# Patient Record
Sex: MALE | Race: BLACK OR AFRICAN AMERICAN | NOT HISPANIC OR LATINO
[De-identification: names, ages, dates, MRNs, and addresses within clinical notes are randomized per-mention and may not be internally consistent; named-entity substitution may affect disease eponyms.]

---

## 2021-06-28 ENCOUNTER — NON-APPOINTMENT (OUTPATIENT)
Age: 36
End: 2021-06-28

## 2021-08-02 ENCOUNTER — APPOINTMENT (OUTPATIENT)
Dept: INTERNAL MEDICINE | Facility: CLINIC | Age: 36
End: 2021-08-02
Payer: MEDICAID

## 2021-08-02 VITALS
DIASTOLIC BLOOD PRESSURE: 58 MMHG | HEIGHT: 69 IN | SYSTOLIC BLOOD PRESSURE: 102 MMHG | BODY MASS INDEX: 19.7 KG/M2 | TEMPERATURE: 98.7 F | WEIGHT: 133 LBS | OXYGEN SATURATION: 98 % | HEART RATE: 70 BPM

## 2021-08-02 DIAGNOSIS — R31.29 OTHER MICROSCOPIC HEMATURIA: ICD-10-CM

## 2021-08-02 DIAGNOSIS — Z23 ENCOUNTER FOR IMMUNIZATION: ICD-10-CM

## 2021-08-02 DIAGNOSIS — F17.210 NICOTINE DEPENDENCE, CIGARETTES, UNCOMPLICATED: ICD-10-CM

## 2021-08-02 DIAGNOSIS — Z72.89 OTHER PROBLEMS RELATED TO LIFESTYLE: ICD-10-CM

## 2021-08-02 DIAGNOSIS — Z00.00 ENCOUNTER FOR GENERAL ADULT MEDICAL EXAMINATION W/OUT ABNORMAL FINDINGS: ICD-10-CM

## 2021-08-02 LAB
BILIRUB UR QL STRIP: NEGATIVE
CLARITY UR: CLEAR
COLLECTION METHOD: NORMAL
GLUCOSE UR-MCNC: NEGATIVE
HCG UR QL: 1 EU/DL
HGB UR QL STRIP.AUTO: NORMAL
KETONES UR-MCNC: NEGATIVE
LEUKOCYTE ESTERASE UR QL STRIP: NEGATIVE
NITRITE UR QL STRIP: NEGATIVE
PH UR STRIP: 6
PROT UR STRIP-MCNC: NEGATIVE
SP GR UR STRIP: >=1.03

## 2021-08-02 PROCEDURE — 36415 COLL VENOUS BLD VENIPUNCTURE: CPT

## 2021-08-02 PROCEDURE — 99385 PREV VISIT NEW AGE 18-39: CPT | Mod: 25

## 2021-08-02 PROCEDURE — 81003 URINALYSIS AUTO W/O SCOPE: CPT | Mod: QW

## 2021-08-02 NOTE — PHYSICAL EXAM
[Normal] : normal gait, coordination grossly intact, no focal deficits [Normal Male:] : meatus normal, the bladder was normal on palpation, the scrotum was normal, there were no testicular masses and no prostate nodules. [de-identified] : testicles no masses

## 2021-08-03 LAB
ALBUMIN SERPL ELPH-MCNC: 4.3 G/DL
ALP BLD-CCNC: 55 U/L
ALT SERPL-CCNC: 8 U/L
ANION GAP SERPL CALC-SCNC: 13 MMOL/L
APPEARANCE: CLEAR
AST SERPL-CCNC: 15 U/L
BACTERIA: NEGATIVE
BASOPHILS # BLD AUTO: 0.03 K/UL
BASOPHILS NFR BLD AUTO: 0.5 %
BILIRUB SERPL-MCNC: 0.6 MG/DL
BILIRUBIN URINE: NEGATIVE
BLOOD URINE: NEGATIVE
BUN SERPL-MCNC: 12 MG/DL
CALCIUM SERPL-MCNC: 9.2 MG/DL
CHLORIDE SERPL-SCNC: 101 MMOL/L
CHOLEST SERPL-MCNC: 145 MG/DL
CO2 SERPL-SCNC: 26 MMOL/L
COLOR: ABNORMAL
CREAT SERPL-MCNC: 0.93 MG/DL
EOSINOPHIL # BLD AUTO: 0.06 K/UL
EOSINOPHIL NFR BLD AUTO: 1 %
GLUCOSE QUALITATIVE U: NEGATIVE
GLUCOSE SERPL-MCNC: 74 MG/DL
HCT VFR BLD CALC: 45.9 %
HDLC SERPL-MCNC: 47 MG/DL
HGB BLD-MCNC: 14.8 G/DL
HYALINE CASTS: 3 /LPF
IMM GRANULOCYTES NFR BLD AUTO: 0.2 %
KETONES URINE: NEGATIVE
LDLC SERPL CALC-MCNC: 78 MG/DL
LEUKOCYTE ESTERASE URINE: NEGATIVE
LYMPHOCYTES # BLD AUTO: 2.17 K/UL
LYMPHOCYTES NFR BLD AUTO: 37.2 %
MAN DIFF?: NORMAL
MCHC RBC-ENTMCNC: 31.1 PG
MCHC RBC-ENTMCNC: 32.2 GM/DL
MCV RBC AUTO: 96.4 FL
MICROSCOPIC-UA: NORMAL
MONOCYTES # BLD AUTO: 0.57 K/UL
MONOCYTES NFR BLD AUTO: 9.8 %
NEUTROPHILS # BLD AUTO: 2.99 K/UL
NEUTROPHILS NFR BLD AUTO: 51.3 %
NITRITE URINE: NEGATIVE
NONHDLC SERPL-MCNC: 98 MG/DL
PH URINE: 6
PLATELET # BLD AUTO: 233 K/UL
POTASSIUM SERPL-SCNC: 4 MMOL/L
PROT SERPL-MCNC: 7.1 G/DL
PROTEIN URINE: NORMAL
RBC # BLD: 4.76 M/UL
RBC # FLD: 13.5 %
RED BLOOD CELLS URINE: 3 /HPF
SODIUM SERPL-SCNC: 139 MMOL/L
SPECIFIC GRAVITY URINE: 1.03
SQUAMOUS EPITHELIAL CELLS: 0 /HPF
TRIGL SERPL-MCNC: 100 MG/DL
UROBILINOGEN URINE: ABNORMAL
WBC # FLD AUTO: 5.83 K/UL
WHITE BLOOD CELLS URINE: 1 /HPF

## 2022-08-03 ENCOUNTER — APPOINTMENT (OUTPATIENT)
Dept: INTERNAL MEDICINE | Facility: CLINIC | Age: 37
End: 2022-08-03